# Patient Record
Sex: FEMALE | Race: WHITE | NOT HISPANIC OR LATINO | URBAN - METROPOLITAN AREA
[De-identification: names, ages, dates, MRNs, and addresses within clinical notes are randomized per-mention and may not be internally consistent; named-entity substitution may affect disease eponyms.]

---

## 2023-02-21 LAB
ALBUMIN (MG/L) IN URINE: <7 MG/L
ALBUMIN/CREATININE (UG/MG) IN URINE: NORMAL UG/MG CRT (ref 0–30)
ANION GAP IN SER/PLAS: 12 MMOL/L (ref 10–20)
APPEARANCE, URINE: CLEAR
BILIRUBIN, URINE: NEGATIVE
BLOOD, URINE: NEGATIVE
CALCIUM (MG/DL) IN SER/PLAS: 9.9 MG/DL (ref 8.6–10.6)
CARBON DIOXIDE, TOTAL (MMOL/L) IN SER/PLAS: 30 MMOL/L (ref 21–32)
CHLORIDE (MMOL/L) IN SER/PLAS: 103 MMOL/L (ref 98–107)
CHOLESTEROL (MG/DL) IN SER/PLAS: 240 MG/DL (ref 0–199)
CHOLESTEROL IN HDL (MG/DL) IN SER/PLAS: 55.7 MG/DL
CHOLESTEROL/HDL RATIO: 4.3
COLOR, URINE: YELLOW
CREATININE (MG/DL) IN SER/PLAS: 0.82 MG/DL (ref 0.5–1.05)
CREATININE (MG/DL) IN URINE: 101 MG/DL (ref 20–320)
GFR FEMALE: 83 ML/MIN/1.73M2
GLUCOSE (MG/DL) IN SER/PLAS: 90 MG/DL (ref 74–99)
GLUCOSE, URINE: NEGATIVE MG/DL
KETONES, URINE: ABNORMAL MG/DL
LDL: 166 MG/DL (ref 0–99)
LEUKOCYTE ESTERASE, URINE: NEGATIVE
NITRITE, URINE: NEGATIVE
PH, URINE: 5 (ref 5–8)
POTASSIUM (MMOL/L) IN SER/PLAS: 4.7 MMOL/L (ref 3.5–5.3)
PROTEIN, URINE: NEGATIVE MG/DL
RBC, URINE: NORMAL /HPF (ref 0–5)
SODIUM (MMOL/L) IN SER/PLAS: 140 MMOL/L (ref 136–145)
SPECIFIC GRAVITY, URINE: 1.02 (ref 1–1.03)
TRIGLYCERIDE (MG/DL) IN SER/PLAS: 92 MG/DL (ref 0–149)
UREA NITROGEN (MG/DL) IN SER/PLAS: 19 MG/DL (ref 6–23)
UROBILINOGEN, URINE: <2 MG/DL (ref 0–1.9)
VLDL: 18 MG/DL (ref 0–40)
WBC, URINE: NORMAL /HPF (ref 0–5)

## 2023-06-19 LAB
ALANINE AMINOTRANSFERASE (SGPT) (U/L) IN SER/PLAS: 13 U/L (ref 7–45)
ALBUMIN (G/DL) IN SER/PLAS: 4.3 G/DL (ref 3.4–5)
ALKALINE PHOSPHATASE (U/L) IN SER/PLAS: 92 U/L (ref 33–110)
ANION GAP IN SER/PLAS: 12 MMOL/L (ref 10–20)
ASPARTATE AMINOTRANSFERASE (SGOT) (U/L) IN SER/PLAS: 15 U/L (ref 9–39)
BASOPHILS (10*3/UL) IN BLOOD BY AUTOMATED COUNT: 0.06 X10E9/L (ref 0–0.1)
BASOPHILS/100 LEUKOCYTES IN BLOOD BY AUTOMATED COUNT: 0.9 % (ref 0–2)
BILIRUBIN TOTAL (MG/DL) IN SER/PLAS: 0.5 MG/DL (ref 0–1.2)
CALCIUM (MG/DL) IN SER/PLAS: 9.6 MG/DL (ref 8.6–10.6)
CARBON DIOXIDE, TOTAL (MMOL/L) IN SER/PLAS: 28 MMOL/L (ref 21–32)
CHLORIDE (MMOL/L) IN SER/PLAS: 105 MMOL/L (ref 98–107)
CREATININE (MG/DL) IN SER/PLAS: 0.7 MG/DL (ref 0.5–1.05)
EOSINOPHILS (10*3/UL) IN BLOOD BY AUTOMATED COUNT: 0.2 X10E9/L (ref 0–0.7)
EOSINOPHILS/100 LEUKOCYTES IN BLOOD BY AUTOMATED COUNT: 2.8 % (ref 0–6)
ERYTHROCYTE DISTRIBUTION WIDTH (RATIO) BY AUTOMATED COUNT: 13.1 % (ref 11.5–14.5)
ERYTHROCYTE MEAN CORPUSCULAR HEMOGLOBIN CONCENTRATION (G/DL) BY AUTOMATED: 32.3 G/DL (ref 32–36)
ERYTHROCYTE MEAN CORPUSCULAR VOLUME (FL) BY AUTOMATED COUNT: 91 FL (ref 80–100)
ERYTHROCYTES (10*6/UL) IN BLOOD BY AUTOMATED COUNT: 4.23 X10E12/L (ref 4–5.2)
GFR FEMALE: >90 ML/MIN/1.73M2
GLUCOSE (MG/DL) IN SER/PLAS: 101 MG/DL (ref 74–99)
HEMATOCRIT (%) IN BLOOD BY AUTOMATED COUNT: 38.7 % (ref 36–46)
HEMOGLOBIN (G/DL) IN BLOOD: 12.5 G/DL (ref 12–16)
IMMATURE GRANULOCYTES/100 LEUKOCYTES IN BLOOD BY AUTOMATED COUNT: 0.3 % (ref 0–0.9)
LEUKOCYTES (10*3/UL) IN BLOOD BY AUTOMATED COUNT: 7 X10E9/L (ref 4.4–11.3)
LYMPHOCYTES (10*3/UL) IN BLOOD BY AUTOMATED COUNT: 1.76 X10E9/L (ref 1.2–4.8)
LYMPHOCYTES/100 LEUKOCYTES IN BLOOD BY AUTOMATED COUNT: 25.1 % (ref 13–44)
MONOCYTES (10*3/UL) IN BLOOD BY AUTOMATED COUNT: 0.57 X10E9/L (ref 0.1–1)
MONOCYTES/100 LEUKOCYTES IN BLOOD BY AUTOMATED COUNT: 8.1 % (ref 2–10)
NEUTROPHILS (10*3/UL) IN BLOOD BY AUTOMATED COUNT: 4.41 X10E9/L (ref 1.2–7.7)
NEUTROPHILS/100 LEUKOCYTES IN BLOOD BY AUTOMATED COUNT: 62.8 % (ref 40–80)
NRBC (PER 100 WBCS) BY AUTOMATED COUNT: 0 /100 WBC (ref 0–0)
PLATELETS (10*3/UL) IN BLOOD AUTOMATED COUNT: 412 X10E9/L (ref 150–450)
POTASSIUM (MMOL/L) IN SER/PLAS: 4 MMOL/L (ref 3.5–5.3)
PROTEIN TOTAL: 6.5 G/DL (ref 6.4–8.2)
SODIUM (MMOL/L) IN SER/PLAS: 141 MMOL/L (ref 136–145)
UREA NITROGEN (MG/DL) IN SER/PLAS: 12 MG/DL (ref 6–23)

## 2023-06-20 LAB
CAMPYLOBACTER GP: NOT DETECTED
NOROVIRUS GI/GII: NOT DETECTED
ROTAVIRUS A: NOT DETECTED
SALMONELLA SP.: NOT DETECTED
SHIGA TOXIN 1: NOT DETECTED
SHIGA TOXIN 2: NOT DETECTED
SHIGELLA SP.: NOT DETECTED
VIBRIO GRP.: NOT DETECTED
YERSINIA ENTEROCOLITICA: NOT DETECTED

## 2023-09-06 ENCOUNTER — HOSPITAL ENCOUNTER (OUTPATIENT)
Dept: DATA CONVERSION | Facility: HOSPITAL | Age: 58
Discharge: HOME | End: 2023-09-06
Payer: COMMERCIAL

## 2023-09-06 DIAGNOSIS — R30.0 DYSURIA: ICD-10-CM

## 2023-09-06 LAB
BACTERIA SPEC CULT: NORMAL
BACTERIA UR QL AUTO: NEGATIVE
BILIRUB UR QL STRIP.AUTO: NEGATIVE
CC # UR: NORMAL /UL
CLARITY UR: CLEAR
COLOR UR: COLORLESS
GLUCOSE UR STRIP.AUTO-MCNC: NEGATIVE MG/DL
HGB UR QL STRIP.AUTO: 1 /HPF (ref 0–3)
HGB UR QL: NEGATIVE
HYALINE CASTS UR QL AUTO: ABNORMAL /LPF
KETONES UR QL STRIP.AUTO: ABNORMAL
LEUKOCYTE ESTERASE UR QL STRIP.AUTO: ABNORMAL
MICROSCOPIC (UA): ABNORMAL
NITRITE UR QL STRIP.AUTO: NEGATIVE
PH UR STRIP.AUTO: 5.5 [PH] (ref 4.6–8)
PROT UR STRIP.AUTO-MCNC: NEGATIVE MG/DL
REPORT STATUS -LH SQ DATA CONVERSION: NORMAL
SERVICE CMNT-IMP: NORMAL
SP GR UR STRIP.AUTO: 1.01 (ref 1–1.03)
SPECIMEN SOURCE: NORMAL
SQUAMOUS UR QL AUTO: ABNORMAL /HPF
URINE CULTURE: ABNORMAL
UROBILINOGEN UR QL STRIP.AUTO: NORMAL MG/DL (ref 0–1)
WBC #/AREA URNS AUTO: 8 /HPF (ref 0–3)

## 2023-09-11 ENCOUNTER — HOSPITAL ENCOUNTER (OUTPATIENT)
Dept: DATA CONVERSION | Facility: HOSPITAL | Age: 58
Discharge: HOME | End: 2023-09-11
Payer: COMMERCIAL

## 2023-09-11 DIAGNOSIS — Z11.51 ENCOUNTER FOR SCREENING FOR HUMAN PAPILLOMAVIRUS (HPV): ICD-10-CM

## 2023-09-11 DIAGNOSIS — Z01.419 ENCOUNTER FOR GYNECOLOGICAL EXAMINATION (GENERAL) (ROUTINE) WITHOUT ABNORMAL FINDINGS: ICD-10-CM

## 2023-09-21 ENCOUNTER — HOSPITAL ENCOUNTER (OUTPATIENT)
Dept: DATA CONVERSION | Facility: HOSPITAL | Age: 58
Discharge: HOME | End: 2023-09-21
Payer: COMMERCIAL

## 2023-09-21 DIAGNOSIS — Z13.820 ENCOUNTER FOR SCREENING FOR OSTEOPOROSIS: ICD-10-CM

## 2023-09-21 DIAGNOSIS — Z12.31 ENCOUNTER FOR SCREENING MAMMOGRAM FOR MALIGNANT NEOPLASM OF BREAST: ICD-10-CM

## 2023-09-21 LAB — 25(OH)D3 SERPL-MCNC: 59 NG/ML (ref 31–100)

## 2024-02-02 ENCOUNTER — HOSPITAL ENCOUNTER (OUTPATIENT)
Dept: RADIOLOGY | Facility: HOSPITAL | Age: 59
Discharge: HOME | End: 2024-02-02
Payer: COMMERCIAL

## 2024-02-02 VITALS — BODY MASS INDEX: 25.83 KG/M2 | HEIGHT: 65 IN | WEIGHT: 155 LBS

## 2024-02-02 DIAGNOSIS — N64.4 MASTODYNIA: ICD-10-CM

## 2024-02-02 PROCEDURE — 77061 BREAST TOMOSYNTHESIS UNI: CPT | Mod: LT

## 2024-02-02 PROCEDURE — 76642 ULTRASOUND BREAST LIMITED: CPT | Mod: LT

## 2024-03-13 DIAGNOSIS — I10 PRIMARY HYPERTENSION: Primary | ICD-10-CM

## 2024-03-13 RX ORDER — AMLODIPINE BESYLATE 10 MG/1
10 TABLET ORAL DAILY
COMMUNITY
End: 2024-03-18 | Stop reason: SDUPTHER

## 2024-03-13 RX ORDER — SPIRONOLACTONE 25 MG/1
25 TABLET ORAL DAILY
COMMUNITY
Start: 2020-04-14 | End: 2024-03-13 | Stop reason: SDUPTHER

## 2024-03-13 RX ORDER — AMLODIPINE BESYLATE 10 MG/1
10 TABLET ORAL DAILY
Qty: 30 TABLET | Refills: 0 | Status: CANCELLED | OUTPATIENT
Start: 2024-03-13

## 2024-03-15 PROBLEM — I10 HTN (HYPERTENSION): Status: ACTIVE | Noted: 2024-03-15

## 2024-03-15 PROBLEM — E04.2 MULTINODULAR THYROID: Status: ACTIVE | Noted: 2024-03-15

## 2024-03-15 PROBLEM — M41.9 SCOLIOSIS: Status: ACTIVE | Noted: 2024-03-15

## 2024-03-15 PROBLEM — J45.909 ASTHMA (HHS-HCC): Status: ACTIVE | Noted: 2024-03-15

## 2024-03-15 PROBLEM — M19.90 INFLAMMATORY ARTHRITIS: Status: ACTIVE | Noted: 2024-03-15

## 2024-03-15 PROBLEM — G43.909 MIGRAINES: Status: ACTIVE | Noted: 2024-03-15

## 2024-03-15 PROBLEM — J30.2 SEASONAL ALLERGIES: Status: ACTIVE | Noted: 2024-03-15

## 2024-03-15 PROBLEM — M19.90 OA (OSTEOARTHRITIS): Status: ACTIVE | Noted: 2024-03-15

## 2024-03-15 PROBLEM — L50.0 ALLERGIC URTICARIA: Status: ACTIVE | Noted: 2024-03-15

## 2024-03-15 PROBLEM — K44.9 HIATAL HERNIA: Status: ACTIVE | Noted: 2024-03-15

## 2024-03-15 PROBLEM — E04.1 THYROID NODULE: Status: ACTIVE | Noted: 2024-03-15

## 2024-03-15 PROBLEM — M54.9 DORSALGIA: Status: ACTIVE | Noted: 2024-03-15

## 2024-03-15 PROBLEM — E78.5 HYPERLIPIDEMIA: Status: ACTIVE | Noted: 2024-03-15

## 2024-03-15 NOTE — PROGRESS NOTES
HPI:       Hypertension:          The patient has no issues; would like rx for her rescue inhaler; has done well this winter; no asthma issues         The patients cardiovascular risk factors include:         Cardiac Risk Factors  Age > 45-male, > 55-female:  YES  +1   Smoking:   NO   Sig. family hx of CHD*:  YES  +1   Hypertension:   YES  +1   Diabetes:   NO   HDL < 35:   NO   HDL > 59:   NO   Total: 3     *- Sig. family h/o CHD per NCEP = MI or sudden death at <56yo in   father or other 1st-degree male relative, or <64yo in mother or   other 1st-degree female relative           The patients adherence to the treatment regimen is Excellent         Responses to the medications has been Excellent    Hyperlipidemia:   The patient does not use medications that may worsen dyslipidemias (corticosteroids, progestins, anabolic steroids, diuretics, beta-blockers, amiodarone, cyclosporine, olanzapine). The patient exercises intermittently.  The patient is not known to have coexisting coronary artery disease.      MEDICAL HISTORY:   Past Medical History:   Diagnosis Date    Allergic urticaria     to etoh    Asthma     exercise induced    Dorsalgia     Hiatal hernia     History of COVID-19     History of melanoma 1997    HTN (hypertension)     Hyperlipidemia     Inflammatory arthritis     Migraines     Multinodular thyroid     with benign nodule biopsy:  [facility]    OA (osteoarthritis)     Scoliosis     Seasonal allergies      MEDICATIONS:   Current Outpatient Medications   Medication Sig Dispense Refill    ascorbic acid (Vitamin C) 1,000 mg tablet once every 24 hours.      azelastine (Astelin) 137 mcg (0.1 %) nasal spray Administer into affected nostril(s) every 12 hours.      calcium carbonate-vitamin D3 600 mg-5 mcg (200 unit) tablet Take by mouth once every 24 hours.      EPINEPHrine 0.3 mg/0.3 mL injection syringe Inject 0.3 mL (0.3 mg) into the muscle.      famotidine (Pepcid) 20 mg tablet Take by mouth once every 24  hours.      fluticasone propion-salmeteroL (Advair Diskus) 100-50 mcg/dose diskus inhaler Inhale 1 puff every 12 hours.      loperamide (Imodium A-D) 2 mg tablet Take by mouth every 6 hours.      spironolactone (Aldactone) 25 mg tablet Take 1 tablet (25 mg) by mouth once daily.      ZINC ACETATE ORAL as directed      albuterol 90 mcg/actuation inhaler Inhale 2 puffs every 6 hours if needed for wheezing or shortness of breath. 18 g 0    amLODIPine (Norvasc) 10 mg tablet Take 1 tablet (10 mg) by mouth once daily. 90 tablet 1    clotrimazole-betamethasone (Lotrisone) cream every 12 hours.       No current facility-administered medications for this visit.     ALLERGIES:   Allergies   Allergen Reactions    Barley Anaphylaxis    Malt Extract Anaphylaxis    Animal Dander Unknown    Losartan Angioedema    Omeprazole Swelling    Pantoprazole Swelling    Red Yeast Rice Headache    Sumatriptan-Naproxen Other    Codeine GI Upset, Nausea/vomiting, Nausea Only and Other    Guaifenesin GI Upset    Sulfites Other     FAMILY HISTORY:   Family History   Problem Relation Name Age of Onset    Colon cancer Mother      Melanoma Mother      Hypertension Mother      Diabetes Mother      Kidney failure Mother      Thyroid disease Mother      Heart attack Father      Hypertension Father      Coronary artery disease Father      No Known Problems Sister      No Known Problems Brother       SOCIAL HISTORY:   Social History     Tobacco Use    Smoking status: Never    Smokeless tobacco: Never   Vaping Use    Vaping Use: Never used   Substance Use Topics    Alcohol use: Yes    Drug use: Never         ROS:      CONSTITUTION:  alert and oriented     OPHTHALMOLOGY:          no Change in vision.         CARDIOLOGY:          no Chest pain.  no Dyspnea on exertion.  no Shortness of breath.         ENDOCRINOLOGY:          no Excessive thirst.  no Excessive urination.  no Fatigue.  no Skin changes.  no Weight gain.  no Weight loss.         SKIN:           no Healing problems.         NEUROLOGY:          no Loss of sensation in specific body area.  no Burning pain in feet.  no Peripheral neuropathy.  no Tingling/numbness.    LABS: due     VITAL SIGNS:  /62   Pulse 64   Wt 72.7 kg (160 lb 3.2 oz)   SpO2 98%   BMI 26.66 kg/m²     General Appearance: awake, alert, oriented, in no acute distress  Lungs: Normal expansion.  Clear to auscultation.  No rales, rhonchi, or wheezing.  Heart: Heart sounds are normal.  Regular rate and rhythm without murmur, gallop or rub.  Extremities: Extremities warm to touch, pink, with no edema.  Neurologic: Alert and oriented x 3, gait normal., reflexes normal and symmetric, strength and  sensation grossly normal    Rosie was seen today for hypertension.  Diagnoses and all orders for this visit:  Primary hypertension (Primary)  Comments:  cont spironolactone  Orders:  -     Albumin , Urine Random; Future  -     Basic Metabolic Panel; Future  -     Urinalysis with Reflex Microscopic; Future  -     amLODIPine (Norvasc) 10 mg tablet; Take 1 tablet (10 mg) by mouth once daily.  Mixed hyperlipidemia  -     Lipid Panel; Future  Mild intermittent asthma, unspecified whether complicated  -     albuterol 90 mcg/actuation inhaler; Inhale 2 puffs every 6 hours if needed for wheezing or shortness of breath.  Screening for colorectal cancer  -     Referral to General Surgery; Future  Need for vaccination  -     Tdap vaccine, age 7 years and older  (BOOSTRIX)

## 2024-03-18 ENCOUNTER — OFFICE VISIT (OUTPATIENT)
Dept: PRIMARY CARE | Facility: CLINIC | Age: 59
End: 2024-03-18
Payer: COMMERCIAL

## 2024-03-18 ENCOUNTER — LAB (OUTPATIENT)
Dept: LAB | Facility: LAB | Age: 59
End: 2024-03-18
Payer: COMMERCIAL

## 2024-03-18 VITALS
WEIGHT: 160.2 LBS | HEART RATE: 64 BPM | DIASTOLIC BLOOD PRESSURE: 62 MMHG | SYSTOLIC BLOOD PRESSURE: 118 MMHG | OXYGEN SATURATION: 98 % | BODY MASS INDEX: 26.66 KG/M2

## 2024-03-18 DIAGNOSIS — E78.2 MIXED HYPERLIPIDEMIA: ICD-10-CM

## 2024-03-18 DIAGNOSIS — Z12.12 SCREENING FOR COLORECTAL CANCER: ICD-10-CM

## 2024-03-18 DIAGNOSIS — Z23 NEED FOR VACCINATION: ICD-10-CM

## 2024-03-18 DIAGNOSIS — Z12.11 SCREENING FOR COLORECTAL CANCER: ICD-10-CM

## 2024-03-18 DIAGNOSIS — J45.20 MILD INTERMITTENT ASTHMA, UNSPECIFIED WHETHER COMPLICATED (HHS-HCC): ICD-10-CM

## 2024-03-18 DIAGNOSIS — I10 PRIMARY HYPERTENSION: ICD-10-CM

## 2024-03-18 DIAGNOSIS — I10 PRIMARY HYPERTENSION: Primary | ICD-10-CM

## 2024-03-18 LAB
ANION GAP SERPL CALC-SCNC: 11 MMOL/L (ref 10–20)
APPEARANCE UR: CLEAR
BACTERIA #/AREA URNS AUTO: ABNORMAL /HPF
BILIRUB UR STRIP.AUTO-MCNC: NEGATIVE MG/DL
BUN SERPL-MCNC: 13 MG/DL (ref 6–23)
CALCIUM SERPL-MCNC: 9.7 MG/DL (ref 8.6–10.3)
CHLORIDE SERPL-SCNC: 105 MMOL/L (ref 98–107)
CHOLEST SERPL-MCNC: 239 MG/DL (ref 0–199)
CHOLESTEROL/HDL RATIO: 4.2
CO2 SERPL-SCNC: 27 MMOL/L (ref 21–32)
COLOR UR: YELLOW
CREAT SERPL-MCNC: 0.7 MG/DL (ref 0.5–1.05)
CREAT UR-MCNC: 61 MG/DL (ref 20–320)
EGFRCR SERPLBLD CKD-EPI 2021: >90 ML/MIN/1.73M*2
GLUCOSE SERPL-MCNC: 104 MG/DL (ref 74–99)
GLUCOSE UR STRIP.AUTO-MCNC: NEGATIVE MG/DL
HDLC SERPL-MCNC: 56.5 MG/DL
KETONES UR STRIP.AUTO-MCNC: NEGATIVE MG/DL
LDLC SERPL CALC-MCNC: 136 MG/DL
LEUKOCYTE ESTERASE UR QL STRIP.AUTO: ABNORMAL
MICROALBUMIN UR-MCNC: <7 MG/L
MICROALBUMIN/CREAT UR: NORMAL MG/G{CREAT}
MUCOUS THREADS #/AREA URNS AUTO: ABNORMAL /LPF
NITRITE UR QL STRIP.AUTO: NEGATIVE
NON HDL CHOLESTEROL: 183 MG/DL (ref 0–149)
PH UR STRIP.AUTO: 7 [PH]
POTASSIUM SERPL-SCNC: 3.7 MMOL/L (ref 3.5–5.3)
PROT UR STRIP.AUTO-MCNC: NEGATIVE MG/DL
RBC # UR STRIP.AUTO: NEGATIVE /UL
RBC #/AREA URNS AUTO: ABNORMAL /HPF
SODIUM SERPL-SCNC: 139 MMOL/L (ref 136–145)
SP GR UR STRIP.AUTO: 1.01
SQUAMOUS #/AREA URNS AUTO: ABNORMAL /HPF
TRIGL SERPL-MCNC: 235 MG/DL (ref 0–149)
UROBILINOGEN UR STRIP.AUTO-MCNC: <2 MG/DL
VLDL: 47 MG/DL (ref 0–40)
WBC #/AREA URNS AUTO: ABNORMAL /HPF

## 2024-03-18 PROCEDURE — 82570 ASSAY OF URINE CREATININE: CPT

## 2024-03-18 PROCEDURE — 3074F SYST BP LT 130 MM HG: CPT | Performed by: FAMILY MEDICINE

## 2024-03-18 PROCEDURE — 90715 TDAP VACCINE 7 YRS/> IM: CPT | Performed by: FAMILY MEDICINE

## 2024-03-18 PROCEDURE — 99214 OFFICE O/P EST MOD 30 MIN: CPT | Performed by: FAMILY MEDICINE

## 2024-03-18 PROCEDURE — 3078F DIAST BP <80 MM HG: CPT | Performed by: FAMILY MEDICINE

## 2024-03-18 PROCEDURE — 1036F TOBACCO NON-USER: CPT | Performed by: FAMILY MEDICINE

## 2024-03-18 PROCEDURE — 36415 COLL VENOUS BLD VENIPUNCTURE: CPT

## 2024-03-18 PROCEDURE — 82043 UR ALBUMIN QUANTITATIVE: CPT

## 2024-03-18 RX ORDER — ALBUTEROL SULFATE 90 UG/1
2 AEROSOL, METERED RESPIRATORY (INHALATION) EVERY 6 HOURS PRN
Qty: 18 G | Refills: 0 | Status: SHIPPED | OUTPATIENT
Start: 2024-03-18

## 2024-03-18 RX ORDER — PANTOPRAZOLE SODIUM 40 MG/1
TABLET, DELAYED RELEASE ORAL EVERY 24 HOURS
COMMUNITY
Start: 2018-02-13 | End: 2024-03-18 | Stop reason: WASHOUT

## 2024-03-18 RX ORDER — SPIRONOLACTONE 25 MG/1
25 TABLET ORAL DAILY
Qty: 90 TABLET | Refills: 1 | Status: SHIPPED | OUTPATIENT
Start: 2024-03-18 | End: 2024-09-14

## 2024-03-18 RX ORDER — AMLODIPINE BESYLATE 10 MG/1
10 TABLET ORAL DAILY
Qty: 90 TABLET | Refills: 1 | Status: SHIPPED | OUTPATIENT
Start: 2024-03-18 | End: 2024-09-14

## 2024-03-18 RX ORDER — AZELASTINE 1 MG/ML
SPRAY, METERED NASAL EVERY 12 HOURS
COMMUNITY

## 2024-03-18 RX ORDER — IBUPROFEN 100 MG/5ML
SUSPENSION, ORAL (FINAL DOSE FORM) ORAL EVERY 24 HOURS
COMMUNITY

## 2024-03-18 RX ORDER — FAMOTIDINE 20 MG/1
TABLET, FILM COATED ORAL EVERY 24 HOURS
COMMUNITY

## 2024-03-18 RX ORDER — ALBUTEROL SULFATE 90 UG/1
2 AEROSOL, METERED RESPIRATORY (INHALATION) EVERY 4 HOURS PRN
COMMUNITY
Start: 2023-09-15 | End: 2024-03-18 | Stop reason: SDUPTHER

## 2024-03-18 RX ORDER — LOPERAMIDE HCL 2 MG
TABLET ORAL EVERY 6 HOURS
COMMUNITY
Start: 2023-06-09

## 2024-03-18 RX ORDER — CALCIUM CARBONATE/VITAMIN D3 600MG-5MCG
TABLET ORAL EVERY 24 HOURS
COMMUNITY

## 2024-03-18 RX ORDER — EPINEPHRINE 0.3 MG/.3ML
0.3 INJECTION SUBCUTANEOUS
COMMUNITY
Start: 2020-08-02

## 2024-03-18 RX ORDER — CLOTRIMAZOLE AND BETAMETHASONE DIPROPIONATE 10; .64 MG/G; MG/G
CREAM TOPICAL EVERY 12 HOURS
COMMUNITY
Start: 2016-09-07

## 2024-03-18 RX ORDER — FLUTICASONE PROPIONATE AND SALMETEROL 100; 50 UG/1; UG/1
1 POWDER RESPIRATORY (INHALATION) EVERY 12 HOURS
COMMUNITY
Start: 2022-05-10

## 2024-03-18 ASSESSMENT — PATIENT HEALTH QUESTIONNAIRE - PHQ9
SUM OF ALL RESPONSES TO PHQ9 QUESTIONS 1 AND 2: 0
1. LITTLE INTEREST OR PLEASURE IN DOING THINGS: NOT AT ALL
2. FEELING DOWN, DEPRESSED OR HOPELESS: NOT AT ALL

## 2024-03-18 ASSESSMENT — PAIN SCALES - GENERAL: PAINLEVEL: 0-NO PAIN

## 2024-05-03 NOTE — PROGRESS NOTES
Subjective   Patient ID: Rosie Webb is a 58 y.o. female who presents for colonoscopy discussion.  HPI  Previous patient of mine here to discuss colonoscopy.  Last one done by me also with EGD 2/21/2018    Procedure:             Colonoscopy  Indications:           Clinically significant diarrhea of unexplained origin    Findings:       The perianal and digital rectal examinations were normal.       A few small-mouthed diverticula were found in the sigmoid colon.       Biopsies for histology were taken with a cold forceps from the entire        colon for evaluation of microscopic colitis.       The terminal ileum appeared normal. Biopsies were taken with a cold        forceps for histology.  Impression:       - Mild diverticulosis in the sigmoid colon.       - The examined portion of the ileum was normal. Biopsied.       - Biopsies were taken with a cold forceps from the entire colon for        evaluation of microscopic colitis.  Recommendation:       - Discharge patient to home.       - Resume previous diet.       - Continue present medications.       - Repeat colonoscopy in 10 years for screening purposes.    Procedure:             Upper GI endoscopy    Indications:           Suspected gastro-esophageal reflux disease  Findings:       The examined esophagus was normal.       Striped mildly erythematous mucosa without bleeding was found in the        gastric antrum. Biopsies were taken with a cold forceps for histology.        Biopsies were taken with a cold forceps for Helicobacter pylori testing.       A medium-sized hiatal hernia was present.       The in the stomach was normal.       The examined duodenum was normal. Biopsies for histology were taken with        a cold forceps for evaluation of celiac disease. Verification of patient        identification for the specimen was done. Estimated blood loss was        minimal.  Impression:       - Normal esophagus.       - Erythematous mucosa in the antrum.  Biopsied.       - Medium-sized hiatal hernia.       - Normal stomach.       - Normal examined duodenum. Biopsied.  Recommendation:       - Discharge patient to home.       - Resume regular diet.       - Continue present medications.    Review of Systems    Objective   Physical Exam    Assessment/Plan   {[email or web address]:64961}         Fina Nolasco MD 05/03/24 7:38 AM

## 2024-05-07 ENCOUNTER — APPOINTMENT (OUTPATIENT)
Dept: SURGERY | Facility: CLINIC | Age: 59
End: 2024-05-07
Payer: COMMERCIAL

## 2024-07-19 DIAGNOSIS — J45.20 MILD INTERMITTENT ASTHMA, UNSPECIFIED WHETHER COMPLICATED (HHS-HCC): ICD-10-CM

## 2024-07-19 RX ORDER — ALBUTEROL SULFATE 90 UG/1
2 AEROSOL, METERED RESPIRATORY (INHALATION) EVERY 6 HOURS PRN
Qty: 18 G | Refills: 0 | Status: SHIPPED | OUTPATIENT
Start: 2024-07-19

## 2024-07-19 RX ORDER — FLUTICASONE PROPIONATE AND SALMETEROL 100; 50 UG/1; UG/1
1 POWDER RESPIRATORY (INHALATION) EVERY 12 HOURS
Qty: 60 EACH | Refills: 11 | Status: SHIPPED | OUTPATIENT
Start: 2024-07-19

## 2024-07-19 NOTE — TELEPHONE ENCOUNTER
LOV 03/18/24, scheduled to see you 9/9/24.  Last fill 3/18/24 for Albuterol and unsure about Advair.  Pt states she only takes Advair when she is sick, and she has been coughing X 3 days.  Confirmed GE in [place]

## 2024-08-01 NOTE — PROGRESS NOTES
HPI:       Hypertension:          The patient has no issues;          The patients cardiovascular risk factors include:         Cardiac Risk Factors  Age > 45-male, > 55-female:  YES  +1   Smoking:   NO   Sig. family hx of CHD*:  YES  +1   Hypertension:   YES  +1   Diabetes:   NO   HDL < 35:   NO   HDL > 59:   NO   Total: 3     *- Sig. family h/o CHD per NCEP = MI or sudden death at <54yo in   father or other 1st-degree male relative, or <66yo in mother or   other 1st-degree female relative           The patients adherence to the treatment regimen is Excellent         Responses to the medications has been Excellent    Hyperlipidemia:   The patient does not use medications that may worsen dyslipidemias (corticosteroids, progestins, anabolic steroids, diuretics, beta-blockers, amiodarone, cyclosporine, olanzapine). The patient exercises intermittently.  The patient is not known to have coexisting coronary artery disease.  Not on a statin; 10 yr risk 3.5%      MEDICAL HISTORY:   Past Medical History:   Diagnosis Date    Allergic urticaria     to etoh    Ast[facility] ([facility])     exercise induced    Dorsalgia     Hiatal hernia     History of COVID-19     History of melanoma 1997    HTN (hypertension)     Hyperlipidemia     10 yr risk 3.5% 2024    Inflammatory arthritis     Migraines     Multinodular thyroid     with benign nodule biopsy:  [facility]    OA (osteoarthritis)     Scoliosis     Seasonal allergies      MEDICATIONS:   Current Outpatient Medications   Medication Sig Dispense Refill    albuterol 90 mcg/actuation inhaler Inhale 2 puffs every 6 hours if needed for wheezing or shortness of breath. 18 g 0    amLODIPine (Norvasc) 10 mg tablet Take 1 tablet (10 mg) by mouth once daily. 90 tablet 1    ascorbic acid (Vitamin C) 1,000 mg tablet once every 24 hours.      azelastine (Astelin) 137 mcg (0.1 %) nasal spray Administer into affected nostril(s) every 12 hours.      calcium carbonate-vitamin D3 600 mg-5 mcg (200 unit)  tablet Take by mouth once every 24 hours.      clotrimazole-betamethasone (Lotrisone) cream every 12 hours.      EPINEPHrine 0.3 mg/0.3 mL injection syringe Inject 0.3 mL (0.3 mg) into the muscle.      famotidine (Pepcid) 20 mg tablet Take by mouth once every 24 hours.      fluticasone propion-salmeteroL (Advair Diskus) 100-50 mcg/dose diskus inhaler Inhale 1 puff every 12 hours. 60 each 11    loperamide (Imodium A-D) 2 mg tablet Take by mouth every 6 hours.      spironolactone (Aldactone) 25 mg tablet Take 1 tablet (25 mg) by mouth once daily. 90 tablet 1    ZINC ACETATE ORAL as directed       No current facility-administered medications for this visit.     ALLERGIES:   Allergies   Allergen Reactions    Barley Anaphylaxis    Malt Extract Anaphylaxis    Animal Dander Unknown    Losartan Angioedema    Omeprazole Swelling    Pantoprazole Swelling    Red Yeast Rice Headache    Sumatriptan-Naproxen Other    Codeine GI Upset, Nausea/vomiting, Nausea Only and Other    Guaifenesin GI Upset    Sulfites Other     FAMILY HISTORY:   Family History   Problem Relation Name Age of Onset    Colon cancer Mother      Melanoma Mother      Hypertension Mother      Diabetes Mother      Kidney failure Mother      Thyroid disease Mother      Heart attack Father      Hypertension Father      Coronary artery disease Father      No Known Problems Sister      No Known Problems Brother       SOCIAL HISTORY:   Social History     Tobacco Use    Smoking status: Never    Smokeless tobacco: Never   Vaping Use    Vaping status: Never Used   Substance Use Topics    Alcohol use: Yes    Drug use: Never         ROS:      CONSTITUTION:  alert and oriented     OPHTHALMOLOGY:          no Change in vision.         CARDIOLOGY:          no Chest pain.  no Dyspnea on exertion.  no Shortness of breath.         ENDOCRINOLOGY:          no Excessive thirst.  no Excessive urination.  no Fatigue.  no Skin changes.  no Weight gain.  no Weight loss.         SKIN:      "     no Healing problems.         NEUROLOGY:          no Loss of sensation in specific body area.  no Burning pain in feet.  no Peripheral neuropathy.  no Tingling/numbness.    LABS:   Lab Results   Component Value Date    GLUCOSE 104 (H) 03/18/2024    CALCIUM 9.7 03/18/2024     03/18/2024    K 3.7 03/18/2024    CO2 27 03/18/2024     03/18/2024    BUN 13 03/18/2024    CREATININE 0.70 03/18/2024     Lab Results   Component Value Date    CHOL 239 (H) 03/18/2024    CHOL 240 (H) 02/21/2023    CHOL 234 (H) 01/22/2022     Lab Results   Component Value Date    HDL 56.5 03/18/2024    HDL 55.7 02/21/2023    HDL 59.0 01/22/2022     Lab Results   Component Value Date    LDLCALC 136 (H) 03/18/2024    LDLCALC 130 08/04/2020    LDLCALC 145 (H) 10/11/2019     Lab Results   Component Value Date    TRIG 235 (H) 03/18/2024    TRIG 92 02/21/2023    TRIG 112 01/22/2022     No components found for: \"CHOLHDL\"   Latest Reference Range & Units 03/18/24 09:48   Albumin, Urine Random Not established mg/L <7.0   Creatinine, Urine Random 20.0 - 320.0 mg/dL 61.0        VITAL SIGNS:  There were no vitals taken for this visit.    General Appearance: awake, alert, oriented, in no acute distress  Lungs: Normal expansion.  Clear to auscultation.  No rales, rhonchi, or wheezing.  Heart: Heart sounds are normal.  Regular rate and rhythm without murmur, gallop or rub.  Extremities: Extremities warm to touch, pink, with no edema.  Neurologic: Alert and oriented x 3, gait normal., reflexes normal and symmetric, strength and  sensation grossly normal    Diagnoses and all orders for this visit:  Primary hypertension (Primary)  Mixed hyperlipidemia             "

## 2024-09-09 ENCOUNTER — APPOINTMENT (OUTPATIENT)
Dept: PRIMARY CARE | Facility: CLINIC | Age: 59
End: 2024-09-09
Payer: COMMERCIAL

## 2024-09-09 DIAGNOSIS — I10 PRIMARY HYPERTENSION: Primary | ICD-10-CM

## 2024-09-09 DIAGNOSIS — E78.2 MIXED HYPERLIPIDEMIA: ICD-10-CM

## 2024-11-14 NOTE — PROGRESS NOTES
HPI:       Hypertension:          The patient has no issues         The patients cardiovascular risk factors include:         Cardiac Risk Factors  Age > 45-male, > 55-female:  YES  +1   Smoking:   NO   Sig. family hx of CHD*:  YES  +1   Hypertension:   YES  +1   Diabetes:   NO   HDL < 35:   NO   HDL > 59:   NO   Total: 3     *- Sig. family h/o CHD per NCEP = MI or sudden death at <54yo in   father or other 1st-degree male relative, or <66yo in mother or   other 1st-degree female relative           The patients adherence to the treatment regimen is Excellent         Responses to the medications has been Excellent    Hyperlipidemia:   The patient does not use medications that may worsen dyslipidemias (corticosteroids, progestins, anabolic steroids, diuretics, beta-blockers, amiodarone, cyclosporine, olanzapine). The patient exercises intermittently.  The patient is not known to have coexisting coronary artery disease.    Is on terzepitide and has lost 10 lbs in the past 5 wks.  MEDICAL HISTORY:   Past Medical History:   Diagnosis Date    Allergic urticaria     to etoh    Asthma     exercise induced    Dorsalgia     Hiatal hernia     History of COVID-19     History of melanoma 1997    HTN (hypertension)     Hyperlipidemia     10 yr risk 3.5% 2024    Inflammatory arthritis     Migraines     Multinodular thyroid     with benign nodule biopsy:  [facility]    OA (osteoarthritis)     Scoliosis     Seasonal allergies      MEDICATIONS:   Current Outpatient Medications   Medication Sig Dispense Refill    albuterol 90 mcg/actuation inhaler Inhale 2 puffs every 6 hours if needed for wheezing or shortness of breath. 18 g 0    ascorbic acid (Vitamin C) 1,000 mg tablet once every 24 hours.      azelastine (Astelin) 137 mcg (0.1 %) nasal spray Administer into affected nostril(s) every 12 hours.      calcium carbonate-vitamin D3 600 mg-5 mcg (200 unit) tablet Take by mouth once every 24 hours.      EPINEPHrine 0.3 mg/0.3 mL  injection syringe Inject 0.3 mL (0.3 mg) into the muscle.      famotidine (Pepcid) 20 mg tablet Take by mouth once every 24 hours.      fluticasone propion-salmeteroL (Advair Diskus) 100-50 mcg/dose diskus inhaler Inhale 1 puff every 12 hours. 60 each 11    loperamide (Imodium A-D) 2 mg tablet Take by mouth every 6 hours.      tirzepatide 2.5 mg/0.5 mL pen injector Inject 2.5 mg under the skin every 7 days.      ZINC ACETATE ORAL as directed      amLODIPine (Norvasc) 5 mg tablet Take 1 tablet (5 mg) by mouth once daily. 90 tablet 1    clotrimazole-betamethasone (Lotrisone) cream Apply topically every 12 hours. 45 g 0    spironolactone (Aldactone) 25 mg tablet Take 1 tablet (25 mg) by mouth once daily. 90 tablet 1     No current facility-administered medications for this visit.     ALLERGIES:   Allergies   Allergen Reactions    Barley Anaphylaxis    Malt Extract Anaphylaxis    Animal Dander Unknown    Losartan Angioedema    Omeprazole Swelling    Pantoprazole Swelling    Red Yeast Rice Headache    Sumatriptan-Naproxen Other    Codeine GI Upset, Nausea/vomiting, Nausea Only and Other    Guaifenesin GI Upset    Sulfites Other     FAMILY HISTORY:   Family History   Problem Relation Name Age of Onset    Colon cancer Mother  50    Melanoma Mother      Hypertension Mother      Diabetes Mother      Kidney failure Mother      Thyroid disease Mother      Heart attack Father      Hypertension Father      Coronary artery disease Father      No Known Problems Sister      No Known Problems Brother       SOCIAL HISTORY:   Social History     Tobacco Use    Smoking status: Never    Smokeless tobacco: Never   Vaping Use    Vaping status: Never Used   Substance Use Topics    Alcohol use: Yes    Drug use: Never         ROS:      CONSTITUTION:  alert and oriented     OPHTHALMOLOGY:          no Change in vision.         CARDIOLOGY:          no Chest pain.  no Dyspnea on exertion.  no Shortness of breath.         ENDOCRINOLOGY:           "no Excessive thirst.  no Excessive urination.  no Fatigue.  no Skin changes.  no Weight gain.  no Weight loss.         SKIN:          no Healing problems.         NEUROLOGY:          no Loss of sensation in specific body area.  no Burning pain in feet.  no Peripheral neuropathy.  no Tingling/numbness.    LABS:   Lab Results   Component Value Date    GLUCOSE 104 (H) 03/18/2024    CALCIUM 9.7 03/18/2024     03/18/2024    K 3.7 03/18/2024    CO2 27 03/18/2024     03/18/2024    BUN 13 03/18/2024    CREATININE 0.70 03/18/2024     Lab Results   Component Value Date    CHOL 239 (H) 03/18/2024    CHOL 240 (H) 02/21/2023    CHOL 234 (H) 01/22/2022     Lab Results   Component Value Date    HDL 56.5 03/18/2024    HDL 55.7 02/21/2023    HDL 59.0 01/22/2022     Lab Results   Component Value Date    LDLCALC 136 (H) 03/18/2024    LDLCALC 130 08/04/2020    LDLCALC 145 (H) 10/11/2019     Lab Results   Component Value Date    TRIG 235 (H) 03/18/2024    TRIG 92 02/21/2023    TRIG 112 01/22/2022     No components found for: \"CHOLHDL\"   Latest Reference Range & Units 03/18/24 09:48   Albumin, Urine Random Not established mg/L <7.0   Creatinine, Urine Random 20.0 - 320.0 mg/dL 61.0        VITAL SIGNS:  /72   Pulse 67   Wt 68.3 kg (150 lb 10.6 oz)   SpO2 99%   BMI 25.07 kg/m²     General Appearance: awake, alert, oriented, in no acute distress  Lungs: Normal expansion.  Clear to auscultation.  No rales, rhonchi, or wheezing.  Heart: Heart sounds are normal.  Regular rate and rhythm without murmur, gallop or rub.  Extremities: Extremities warm to touch, pink, with no edema.  Neurologic: Alert and oriented x 3, gait normal., reflexes normal and symmetric, strength and  sensation grossly normal    Rosie was seen today for hypertension.  Diagnoses and all orders for this visit:  Primary hypertension (Primary)  Comments:  cont tirzepatide by [facility] in [place]  Orders:  -     amLODIPine (Norvasc) 5 mg tablet; Take 1 " tablet (5 mg) by mouth once daily.  -     spironolactone (Aldactone) 25 mg tablet; Take 1 tablet (25 mg) by mouth once daily.  -     Follow Up In Primary Care - Established; Future  Mixed hyperlipidemia  Comments:  diet/exercise  Rash  -     clotrimazole-betamethasone (Lotrisone) cream; Apply topically every 12 hours.

## 2024-11-15 ENCOUNTER — OFFICE VISIT (OUTPATIENT)
Dept: PRIMARY CARE | Facility: CLINIC | Age: 59
End: 2024-11-15
Payer: COMMERCIAL

## 2024-11-15 VITALS
DIASTOLIC BLOOD PRESSURE: 72 MMHG | BODY MASS INDEX: 25.07 KG/M2 | WEIGHT: 150.66 LBS | OXYGEN SATURATION: 99 % | SYSTOLIC BLOOD PRESSURE: 110 MMHG | HEART RATE: 67 BPM

## 2024-11-15 DIAGNOSIS — R21 RASH: ICD-10-CM

## 2024-11-15 DIAGNOSIS — I10 PRIMARY HYPERTENSION: Primary | ICD-10-CM

## 2024-11-15 DIAGNOSIS — E78.2 MIXED HYPERLIPIDEMIA: ICD-10-CM

## 2024-11-15 PROCEDURE — 3074F SYST BP LT 130 MM HG: CPT | Performed by: FAMILY MEDICINE

## 2024-11-15 PROCEDURE — 99214 OFFICE O/P EST MOD 30 MIN: CPT | Performed by: FAMILY MEDICINE

## 2024-11-15 PROCEDURE — 1036F TOBACCO NON-USER: CPT | Performed by: FAMILY MEDICINE

## 2024-11-15 PROCEDURE — 3078F DIAST BP <80 MM HG: CPT | Performed by: FAMILY MEDICINE

## 2024-11-15 RX ORDER — SPIRONOLACTONE 25 MG/1
25 TABLET ORAL DAILY
Qty: 90 TABLET | Refills: 1 | Status: SHIPPED | OUTPATIENT
Start: 2024-11-15 | End: 2025-05-14

## 2024-11-15 RX ORDER — AMLODIPINE BESYLATE 5 MG/1
5 TABLET ORAL DAILY
Qty: 90 TABLET | Refills: 1 | Status: SHIPPED | OUTPATIENT
Start: 2024-11-15 | End: 2025-05-14

## 2024-11-15 RX ORDER — CLOTRIMAZOLE AND BETAMETHASONE DIPROPIONATE 10; .64 MG/G; MG/G
CREAM TOPICAL EVERY 12 HOURS
Qty: 45 G | Refills: 0 | Status: SHIPPED | OUTPATIENT
Start: 2024-11-15

## 2024-11-15 ASSESSMENT — PAIN SCALES - GENERAL: PAINLEVEL_OUTOF10: 0-NO PAIN

## 2024-11-15 ASSESSMENT — PATIENT HEALTH QUESTIONNAIRE - PHQ9
1. LITTLE INTEREST OR PLEASURE IN DOING THINGS: NOT AT ALL
SUM OF ALL RESPONSES TO PHQ9 QUESTIONS 1 AND 2: 0
2. FEELING DOWN, DEPRESSED OR HOPELESS: NOT AT ALL

## 2025-01-16 ENCOUNTER — OFFICE VISIT (OUTPATIENT)
Dept: SURGERY | Facility: CLINIC | Age: 60
End: 2025-01-16
Payer: COMMERCIAL

## 2025-01-16 VITALS
HEART RATE: 68 BPM | HEIGHT: 65 IN | BODY MASS INDEX: 24.99 KG/M2 | OXYGEN SATURATION: 98 % | RESPIRATION RATE: 17 BRPM | DIASTOLIC BLOOD PRESSURE: 86 MMHG | WEIGHT: 150 LBS | SYSTOLIC BLOOD PRESSURE: 126 MMHG | TEMPERATURE: 97.9 F

## 2025-01-16 DIAGNOSIS — L98.8 SKIN LESION OF BREAST: ICD-10-CM

## 2025-01-16 DIAGNOSIS — N64.4 MASTODYNIA OF LEFT BREAST: Primary | ICD-10-CM

## 2025-01-16 PROCEDURE — 3008F BODY MASS INDEX DOCD: CPT | Performed by: SURGERY

## 2025-01-16 PROCEDURE — 11104 PUNCH BX SKIN SINGLE LESION: CPT | Performed by: SURGERY

## 2025-01-16 PROCEDURE — 99204 OFFICE O/P NEW MOD 45 MIN: CPT | Performed by: SURGERY

## 2025-01-16 PROCEDURE — 99214 OFFICE O/P EST MOD 30 MIN: CPT | Mod: 25 | Performed by: SURGERY

## 2025-01-16 PROCEDURE — 3074F SYST BP LT 130 MM HG: CPT | Performed by: SURGERY

## 2025-01-16 PROCEDURE — 3079F DIAST BP 80-89 MM HG: CPT | Performed by: SURGERY

## 2025-01-16 ASSESSMENT — PATIENT HEALTH QUESTIONNAIRE - PHQ9
2. FEELING DOWN, DEPRESSED OR HOPELESS: NOT AT ALL
1. LITTLE INTEREST OR PLEASURE IN DOING THINGS: NOT AT ALL
SUM OF ALL RESPONSES TO PHQ9 QUESTIONS 1 & 2: 0

## 2025-01-16 ASSESSMENT — COLUMBIA-SUICIDE SEVERITY RATING SCALE - C-SSRS
1. IN THE PAST MONTH, HAVE YOU WISHED YOU WERE DEAD OR WISHED YOU COULD GO TO SLEEP AND NOT WAKE UP?: NO
2. HAVE YOU ACTUALLY HAD ANY THOUGHTS OF KILLING YOURSELF?: NO
6. HAVE YOU EVER DONE ANYTHING, STARTED TO DO ANYTHING, OR PREPARED TO DO ANYTHING TO END YOUR LIFE?: NO

## 2025-01-16 ASSESSMENT — ENCOUNTER SYMPTOMS
DEPRESSION: 0
LOSS OF SENSATION IN FEET: 0
OCCASIONAL FEELINGS OF UNSTEADINESS: 0

## 2025-01-16 ASSESSMENT — LIFESTYLE VARIABLES
SKIP TO QUESTIONS 9-10: 1
AUDIT-C TOTAL SCORE: 2
HOW OFTEN DO YOU HAVE A DRINK CONTAINING ALCOHOL: 2-4 TIMES A MONTH
HOW OFTEN DO YOU HAVE SIX OR MORE DRINKS ON ONE OCCASION: NEVER
HOW MANY STANDARD DRINKS CONTAINING ALCOHOL DO YOU HAVE ON A TYPICAL DAY: 1 OR 2

## 2025-01-16 ASSESSMENT — PAIN SCALES - GENERAL: PAINLEVEL_OUTOF10: 0-NO PAIN

## 2025-01-16 NOTE — ASSESSMENT & PLAN NOTE
Patient with a new skin lesion left breast that is not resolving.  Will do punch biopsy here in the office.  Likely benign inflammation.  Does not appear to be a malignant issue.  Will call patient with results.

## 2025-01-16 NOTE — ASSESSMENT & PLAN NOTE
Patient with significant left breast pain that comes and goes.  Patient overdue for bilateral mammogram.  Patient reassured the breast pain is not pathologic.  Patient to continue to avoid caffeine.  Patient to use Motrin and heat when it causes significant discomfort.  Patient overdue for bilateral mammogram.  Will place the order and call with results

## 2025-01-16 NOTE — PROGRESS NOTES
Subjective   Patient ID: Rosie Webb is a 59 y.o. female who presents for New Patient Visit (SPOT ON BREAST). Patient discovered a spot on her left breast that doesn't seem to be going away.  Chronic left breast pain.  It comes and goes.  Patient noticed a red lesion on the breast.  It has been about 5 weeks.  Initially it was more irritated.  Now it is just a residual circular round lesion.  No pain.  No drainage.  No previous episodes in the past.  Previous breast biopsies.  No nipple discharge.  Patient does get significant left breast pain.  She avoids caffeine for this.    Past Medical History:   Diagnosis Date    Allergic urticaria     to etoh    Asthma     exercise induced    Dorsalgia     Hiatal hernia     History of COVID-19     History of melanoma 1997    HTN (hypertension)     Hyperlipidemia     10 yr risk 3.5% 2024    Inflammatory arthritis     Migraines     Multinodular thyroid     with benign nodule biopsy:   [facility]    OA (osteoarthritis)     Scoliosis     Seasonal allergies      Past Surgical History:   Procedure Laterality Date    CHOLECYSTECTOMY  1997    ENDOMETRIAL ABLATION  2000    ESOPHAGOGASTRODUODENOSCOPY  2018    hiatal hernia; [place]    SKIN CANCER EXCISION      melanoma-right upper thigh    TONSILLECTOMY  1971     Family History   Problem Relation Name Age of Onset    Colon cancer Mother  50    Melanoma Mother      Hypertension Mother      Diabetes Mother      Kidney failure Mother      Thyroid disease Mother      Heart attack Father      Hypertension Father      Coronary artery disease Father      No Known Problems Sister      No Known Problems Brother       Allergies   Allergen Reactions    Barley Anaphylaxis    Malt Extract Anaphylaxis    Animal Dander Unknown    Losartan Angioedema    Omeprazole Swelling    Pantoprazole Swelling    Red Yeast Rice Headache    Sumatriptan-Naproxen Other    Codeine GI Upset, Nausea/vomiting, Nausea Only and Other    Guaifenesin GI Upset    Sulfites  "Other       Social History:  Tobacco Use - no  Do you use any recreational drugs - no  Alcohol Use - socially  Caffeine Intake - yes  Working - full time  Marital status -   Living with - spouse, children      HPI  /86 (BP Location: Left arm, Patient Position: Sitting, BP Cuff Size: Adult)   Pulse 68   Temp 36.6 °C (97.9 °F) (Skin)   Resp 17   Ht 1.651 m (5' 5\")   Wt 68 kg (150 lb)   SpO2 98%   BMI 24.96 kg/m²     Review of Systems    Objective   Physical Exam  Physical Exam:  /86 (BP Location: Left arm, Patient Position: Sitting, BP Cuff Size: Adult)   Pulse 68   Temp 36.6 °C (97.9 °F) (Skin)   Resp 17   Ht 1.651 m (5' 5\")   Wt 68 kg (150 lb)   SpO2 98%   BMI 24.96 kg/m²    GENERAL APPEARANCE: Patient appears in no acute distress.   EYES: Sclera non-icteric, PERRLA.   ENT Normal appearance of ears and nose.   NECK/THYROID: Neck: no masses. Thyroid: no masses.   LYMPH NODES: No cervical or supraclavicular lymphadenopathy.   CARDIOVASCULAR Heart: RRR, no murmurs; Carotid bruits: none; Peripheral edema: none.   RESPIRATORY: Lungs: Bilateral inspiratory and expiratory wheezing; no respiratory distress.   BREASTS: Exam done both in upright and supine position. On inspection no skin dimpling with a 1 cm circular erythematous lesion nonraised in the left breast.  No central pit.  Peers to be contained within the dermis of the skin., no peau d'orange; Masses: none palpable in either breast.  Axillary lymphadenopathy: none.   GI (ABDOMEN) No intraabdominal mass appreciated, no hepatosplenomegaly; Hernia: none; Tenderness: none.   PSYCH: Patient oriented to time, place and person, normal affect.      PROCEDURE : Left breast area was prepped and draped.  1% lidocaine with epi was infiltrated into the base of the lesion.  A millimeter punch biopsy was performed without difficulty.  Dry dressing applied.  Patient Toller procedure well      Assessment/Plan   Problem List Items Addressed This " Visit             ICD-10-CM    Mastodynia of left breast - Primary N64.4     Patient with significant left breast pain that comes and goes.  Patient overdue for bilateral mammogram.  Patient reassured the breast pain is not pathologic.  Patient to continue to avoid caffeine.  Patient to use Motrin and heat when it causes significant discomfort.  Patient overdue for bilateral mammogram.  Will place the order and call with results         Relevant Orders    BI mammo bilateral screening tomosynthesis            Fina Nolasco MD 01/16/25 3:01 PM

## 2025-01-24 ENCOUNTER — OFFICE VISIT (OUTPATIENT)
Dept: PRIMARY CARE | Facility: CLINIC | Age: 60
End: 2025-01-24
Payer: COMMERCIAL

## 2025-01-24 VITALS
WEIGHT: 143.6 LBS | DIASTOLIC BLOOD PRESSURE: 64 MMHG | OXYGEN SATURATION: 99 % | BODY MASS INDEX: 23.9 KG/M2 | SYSTOLIC BLOOD PRESSURE: 120 MMHG | HEART RATE: 90 BPM

## 2025-01-24 DIAGNOSIS — J45.21 MILD INTERMITTENT ASTHMA WITH ACUTE EXACERBATION (HHS-HCC): ICD-10-CM

## 2025-01-24 DIAGNOSIS — J02.9 SORE THROAT: Primary | ICD-10-CM

## 2025-01-24 LAB — POC RAPID STREP: NEGATIVE

## 2025-01-24 PROCEDURE — 87081 CULTURE SCREEN ONLY: CPT | Performed by: FAMILY MEDICINE

## 2025-01-24 RX ORDER — METHYLPREDNISOLONE ACETATE 80 MG/ML
80 INJECTION, SUSPENSION INTRA-ARTICULAR; INTRALESIONAL; INTRAMUSCULAR; SOFT TISSUE ONCE
Status: COMPLETED | OUTPATIENT
Start: 2025-01-24 | End: 2025-01-24

## 2025-01-24 RX ORDER — AZITHROMYCIN 250 MG/1
TABLET, FILM COATED ORAL
Qty: 6 TABLET | Refills: 0 | Status: SHIPPED | OUTPATIENT
Start: 2025-01-24

## 2025-01-24 RX ADMIN — METHYLPREDNISOLONE ACETATE 80 MG: 80 INJECTION, SUSPENSION INTRA-ARTICULAR; INTRALESIONAL; INTRAMUSCULAR; SOFT TISSUE at 16:11

## 2025-01-24 ASSESSMENT — ENCOUNTER SYMPTOMS
SORE THROAT: 1
WHEEZING: 1
FEVER: 1
HEADACHES: 1
RHINORRHEA: 1
SWEATS: 1
COUGH: 1

## 2025-01-24 ASSESSMENT — PAIN SCALES - GENERAL: PAINLEVEL_OUTOF10: 5

## 2025-01-24 NOTE — PROGRESS NOTES
Sinusitis:  sxs x 4 days; hasn't checked for covid         presents with c/o Facial pain Yes frontal         Nasal congestion Yes          Rhinorrhea Yes yellow         Fever Yes          Sore throat Yes          Post-nasal drip Yes           Cough productive of green/yellow sputum   Ear/General:          c/o Pain  popping but no pain     ENT/Respiratory:          c/o Shortness of breath No          wheezing Yes using advair and rescue inhaler    MEDICAL HISTORY:    Patient Active Problem List   Diagnosis    Allergic urticaria    Asthma    Dorsalgia    Hiatal hernia    HTN (hypertension)    Hyperlipidemia    Inflammatory arthritis    Migraines    Multinodular thyroid    Seasonal allergies    OA (osteoarthritis)    Scoliosis    Mastodynia of left breast    Skin lesion of breast      MEDICATIONS:    Current Outpatient Medications   Medication Sig Dispense Refill    albuterol 90 mcg/actuation inhaler Inhale 2 puffs every 6 hours if needed for wheezing or shortness of breath. 18 g 0    amLODIPine (Norvasc) 5 mg tablet Take 1 tablet (5 mg) by mouth once daily. (Patient taking differently: Take 0.5 tablets (2.5 mg) by mouth once daily.) 90 tablet 1    ascorbic acid (Vitamin C) 1,000 mg tablet once every 24 hours.      azelastine (Astelin) 137 mcg (0.1 %) nasal spray Administer into affected nostril(s) every 12 hours.      calcium carbonate-vitamin D3 600 mg-5 mcg (200 unit) tablet Take by mouth once every 24 hours.      clotrimazole-betamethasone (Lotrisone) cream Apply topically every 12 hours. 45 g 0    EPINEPHrine 0.3 mg/0.3 mL injection syringe Inject 0.3 mL (0.3 mg) into the muscle.      famotidine (Pepcid) 20 mg tablet Take by mouth once every 24 hours.      fluticasone propion-salmeteroL (Advair Diskus) 100-50 mcg/dose diskus inhaler Inhale 1 puff every 12 hours. 60 each 11    spironolactone (Aldactone) 25 mg tablet Take 1 tablet (25 mg) by mouth once daily. (Patient taking differently: Take 0.5 tablets (12.5 mg)  by mouth once daily.) 90 tablet 1    tirzepatide 2.5 mg/0.5 mL pen injector Inject 2.5 mg under the skin every 7 days.      ZINC ACETATE ORAL as directed      azithromycin (Zithromax Z-Wolf) 250 mg tablet 2 tablets  Day 1 then 1 tablet Days 2-5 6 tablet 0     Current Facility-Administered Medications   Medication Dose Route Frequency Provider Last Rate Last Admin    methylPREDNISolone acetate (DEPO-Medrol) injection 80 mg  80 mg intramuscular Once Jaquelin Pollock MD         ALLERGIES:   Allergies   Allergen Reactions    Barley Anaphylaxis    Losartan Angioedema    Malt Extract Anaphylaxis    Sulfites Rash    Sumatriptan-Naproxen Other    Animal Dander Unknown     sneezing    Codeine GI Upset, Nausea/vomiting, Nausea Only and Other    Guaifenesin GI Upset    Omeprazole Swelling     Ankle swelling    Pantoprazole Swelling     Ankle swelling    Red Yeast Rice Headache     SOCIAL HISTORY:   Social History     Tobacco Use    Smoking status: Never     Passive exposure: Never    Smokeless tobacco: Never   Vaping Use    Vaping status: Never Used   Substance Use Topics    Alcohol use: Not Currently    Drug use: Never       ROS:       CONSTITUTION:  see HPI for details        HEENT:          see HPI for details.         RESPIRATORY:          See HPI for details.           VITAL SIGNS:  /64   Pulse 90   Wt 65.1 kg (143 lb 9.6 oz)   SpO2 99%   BMI 23.90 kg/m²     PE:  General: alert and oriented; NAD   HEENT:  nc/at, sinuses tender frontal areas, ears with nl external canals, TM normal w/o redness, bulging on right side but bulging on left; throat normal with redness but no exudate or tonsillar swelling  NECK:  no lymphadenopathy; FROM  LUNGS:  CTA, no wheezing/rhonchi/rales  HEART:  RRR, no murmurs      Rosie was seen today for sore throat.  Diagnoses and all orders for this visit:  Sore throat (Primary)  -     POCT rapid strep A manually resulted  -     Group A Streptococcus, Culture; Future  -     azithromycin  (Zithromax Z-Wolf) 250 mg tablet; 2 tablets  Day 1 then 1 tablet Days 2-5  Mild intermittent asthma with acute exacerbation ([facility])  -     methylPREDNISolone acetate (DEPO-Medrol) injection 80 mg       Answers submitted by the patient for this visit:  Cough Questionnaire (Submitted on 1/24/2025)  Chief Complaint: Cough  Chronicity: new  Onset: in the past 7 days  Progression since onset: gradually worsening  Frequency: every few minutes  Cough characteristics: productive of purulent sputum  ear congestion: Yes  ear pain: Yes  fever: Yes  headaches: Yes  nasal congestion: Yes  postnasal drip: Yes  rhinorrhea: Yes  sore throat: Yes  sweats: Yes  wheezing: Yes  Aggravated by: exercise

## 2025-01-26 LAB — S PYO THROAT QL CULT: NORMAL

## 2025-01-27 LAB — S PYO THROAT QL CULT: NORMAL

## 2025-01-28 LAB
LAB AP ASR DISCLAIMER: NORMAL
LABORATORY COMMENT REPORT: NORMAL
PATH REPORT.FINAL DX SPEC: NORMAL
PATH REPORT.GROSS SPEC: NORMAL
PATH REPORT.RELEVANT HX SPEC: NORMAL
PATH REPORT.TOTAL CANCER: NORMAL

## 2025-01-28 PROCEDURE — 88141 CYTOPATH C/V INTERPRET: CPT | Performed by: PATHOLOGY

## 2025-01-28 PROCEDURE — 87624 HPV HI-RISK TYP POOLED RSLT: CPT

## 2025-01-28 PROCEDURE — 88175 CYTOPATH C/V AUTO FLUID REDO: CPT

## 2025-01-29 ENCOUNTER — LAB REQUISITION (OUTPATIENT)
Dept: LAB | Facility: HOSPITAL | Age: 60
End: 2025-01-29
Payer: COMMERCIAL

## 2025-01-29 DIAGNOSIS — Z11.51 ENCOUNTER FOR SCREENING FOR HUMAN PAPILLOMAVIRUS (HPV): ICD-10-CM

## 2025-01-29 DIAGNOSIS — R87.610 ATYPICAL SQUAMOUS CELLS OF UNDETERMINED SIGNIFICANCE ON CYTOLOGIC SMEAR OF CERVIX (ASC-US): ICD-10-CM

## 2025-02-20 ENCOUNTER — APPOINTMENT (OUTPATIENT)
Dept: SURGERY | Facility: CLINIC | Age: 60
End: 2025-02-20
Payer: COMMERCIAL

## 2025-03-08 ENCOUNTER — HOSPITAL ENCOUNTER (OUTPATIENT)
Dept: RADIOLOGY | Facility: HOSPITAL | Age: 60
Discharge: HOME | End: 2025-03-08
Payer: COMMERCIAL

## 2025-03-08 VITALS — BODY MASS INDEX: 22.66 KG/M2 | HEIGHT: 65 IN | WEIGHT: 136 LBS

## 2025-03-08 DIAGNOSIS — N64.4 MASTODYNIA OF LEFT BREAST: ICD-10-CM

## 2025-03-08 PROCEDURE — 77063 BREAST TOMOSYNTHESIS BI: CPT | Performed by: STUDENT IN AN ORGANIZED HEALTH CARE EDUCATION/TRAINING PROGRAM

## 2025-03-08 PROCEDURE — 77067 SCR MAMMO BI INCL CAD: CPT | Performed by: STUDENT IN AN ORGANIZED HEALTH CARE EDUCATION/TRAINING PROGRAM

## 2025-03-08 PROCEDURE — 77067 SCR MAMMO BI INCL CAD: CPT

## 2025-04-17 ENCOUNTER — APPOINTMENT (OUTPATIENT)
Dept: RADIOLOGY | Facility: HOSPITAL | Age: 60
End: 2025-04-17
Payer: COMMERCIAL

## 2025-05-07 ENCOUNTER — HOSPITAL ENCOUNTER (OUTPATIENT)
Dept: RADIOLOGY | Facility: HOSPITAL | Age: 60
Discharge: HOME | End: 2025-05-07
Payer: COMMERCIAL

## 2025-05-07 ENCOUNTER — APPOINTMENT (OUTPATIENT)
Dept: RADIOLOGY | Facility: HOSPITAL | Age: 60
End: 2025-05-07
Payer: COMMERCIAL

## 2025-05-07 DIAGNOSIS — R92.8 ABNORMAL MAMMOGRAM: ICD-10-CM

## 2025-05-07 PROCEDURE — 77061 BREAST TOMOSYNTHESIS UNI: CPT | Mod: RT
